# Patient Record
Sex: MALE | Race: WHITE | Employment: FULL TIME | URBAN - METROPOLITAN AREA
[De-identification: names, ages, dates, MRNs, and addresses within clinical notes are randomized per-mention and may not be internally consistent; named-entity substitution may affect disease eponyms.]

---

## 2022-06-04 ENCOUNTER — HOSPITAL ENCOUNTER (EMERGENCY)
Age: 52
Discharge: HOME | End: 2022-06-05
Attending: EMERGENCY MEDICINE
Payer: COMMERCIAL

## 2022-06-04 ENCOUNTER — APPOINTMENT (OUTPATIENT)
Dept: GENERAL RADIOLOGY | Age: 52
End: 2022-06-04
Payer: COMMERCIAL

## 2022-06-04 DIAGNOSIS — Z72.0 TOBACCO USE: ICD-10-CM

## 2022-06-04 DIAGNOSIS — R47.1 DYSARTHRIA: ICD-10-CM

## 2022-06-04 DIAGNOSIS — R03.0 ELEVATED BLOOD PRESSURE READING: ICD-10-CM

## 2022-06-04 DIAGNOSIS — R29.898 WEAKNESS OF LEFT ARM: ICD-10-CM

## 2022-06-04 DIAGNOSIS — I63.9 CEREBROVASCULAR ACCIDENT (CVA), UNSPECIFIED MECHANISM (HCC): Primary | ICD-10-CM

## 2022-06-04 LAB
GLUCOSE BLD-MCNC: 168 MG/DL (ref 70–99)
PERFORMED ON: ABNORMAL

## 2022-06-04 PROCEDURE — 99285 EMERGENCY DEPT VISIT HI MDM: CPT

## 2022-06-04 NOTE — LETTER
[facility] Emergency Department  [address] [address]  Phone: [phone]    Patient: Jennifer Guerrero  YOB: 1970  Date: 6/5/2022 Time: 2:41 AM    Leaving the [address] [place] [address] Advice    Chart #:[id number]    This will certify that I, the undersigned,    ______________________________________________________________________    A patient in the above named medical center, having requested discharge and removal from the medical center against the advice of my attending physician(s), hereby release the Emergency Department, its physicians, officers and employees, severally and individually, from any and all liability of any nature whatsoever for any injury or harm or complication of any kind that may result directly or indirectly, by reason of my terminating my stay as a patient from [facility], and hereby waive any and all rights of action I may now have or later acquire as a result of my voluntary departure from [facility] and the termination of my stay as a patient therein. This release is made with the full knowledge of the danger that may result from the action which I am taking.       Date:_______________________                         ___________________________                                                                                    Patient/Legal Representative    Witness:        ____________________________                          ___________________________  Nurse                                                                        Physician

## 2022-06-05 ENCOUNTER — APPOINTMENT (OUTPATIENT)
Dept: CT IMAGING | Age: 52
End: 2022-06-05
Payer: COMMERCIAL

## 2022-06-05 ENCOUNTER — APPOINTMENT (OUTPATIENT)
Dept: GENERAL RADIOLOGY | Age: 52
End: 2022-06-05
Payer: COMMERCIAL

## 2022-06-05 VITALS
BODY MASS INDEX: 29.68 KG/M2 | TEMPERATURE: 97.1 F | HEIGHT: 69 IN | SYSTOLIC BLOOD PRESSURE: 168 MMHG | RESPIRATION RATE: 21 BRPM | HEART RATE: 82 BPM | DIASTOLIC BLOOD PRESSURE: 108 MMHG | WEIGHT: 200.4 LBS | OXYGEN SATURATION: 97 %

## 2022-06-05 PROBLEM — F18.10 ABUSE OF SMOKED SUBSTANCE (HCC): Status: ACTIVE | Noted: 2022-06-05

## 2022-06-05 PROBLEM — R53.1 LEFT-SIDED WEAKNESS: Status: ACTIVE | Noted: 2022-06-05

## 2022-06-05 LAB
A/G RATIO: 1.6 (ref 1.1–2.2)
ALBUMIN SERPL-MCNC: 4.4 G/DL (ref 3.4–5)
ALP BLD-CCNC: 85 U/L (ref 40–129)
ALT SERPL-CCNC: <5 U/L (ref 10–40)
ANION GAP SERPL CALCULATED.3IONS-SCNC: 16 MMOL/L (ref 3–16)
AST SERPL-CCNC: <5 U/L (ref 15–37)
BASOPHILS ABSOLUTE: 0.2 K/UL (ref 0–0.2)
BASOPHILS RELATIVE PERCENT: 1.8 %
BILIRUB SERPL-MCNC: 0.3 MG/DL (ref 0–1)
BUN BLDV-MCNC: 15 MG/DL (ref 7–20)
CALCIUM SERPL-MCNC: 9.1 MG/DL (ref 8.3–10.6)
CHLORIDE BLD-SCNC: 100 MMOL/L (ref 99–110)
CO2: 19 MMOL/L (ref 21–32)
CREAT SERPL-MCNC: 1.1 MG/DL (ref 0.9–1.3)
EKG ATRIAL RATE: 88 BPM
EKG DIAGNOSIS: NORMAL
EKG P AXIS: 45 DEGREES
EKG P-R INTERVAL: 162 MS
EKG Q-T INTERVAL: 356 MS
EKG QRS DURATION: 86 MS
EKG QTC CALCULATION (BAZETT): 430 MS
EKG R AXIS: 63 DEGREES
EKG T AXIS: 51 DEGREES
EKG VENTRICULAR RATE: 88 BPM
EOSINOPHILS ABSOLUTE: 0.3 K/UL (ref 0–0.6)
EOSINOPHILS RELATIVE PERCENT: 3.7 %
GFR AFRICAN AMERICAN: >60
GFR NON-AFRICAN AMERICAN: >60
GLUCOSE BLD-MCNC: 161 MG/DL (ref 70–99)
HCT VFR BLD CALC: 44.8 % (ref 40.5–52.5)
HEMOGLOBIN: 16.4 G/DL (ref 13.5–17.5)
INR BLD: 1.01 (ref 0.87–1.14)
LYMPHOCYTES ABSOLUTE: 2.9 K/UL (ref 1–5.1)
LYMPHOCYTES RELATIVE PERCENT: 34 %
MCH RBC QN AUTO: 34.2 PG (ref 26–34)
MCHC RBC AUTO-ENTMCNC: 36.6 G/DL (ref 31–36)
MCV RBC AUTO: 93.3 FL (ref 80–100)
MONOCYTES ABSOLUTE: 0.8 K/UL (ref 0–1.3)
MONOCYTES RELATIVE PERCENT: 9.1 %
NEUTROPHILS ABSOLUTE: 4.3 K/UL (ref 1.7–7.7)
NEUTROPHILS RELATIVE PERCENT: 51.4 %
PDW BLD-RTO: 13.7 % (ref 12.4–15.4)
PLATELET # BLD: 176 K/UL (ref 135–450)
PMV BLD AUTO: 9 FL (ref 5–10.5)
POTASSIUM REFLEX MAGNESIUM: 5.4 MMOL/L (ref 3.5–5.1)
PROTHROMBIN TIME: 13.2 SEC (ref 11.7–14.5)
RBC # BLD: 4.8 M/UL (ref 4.2–5.9)
SODIUM BLD-SCNC: 135 MMOL/L (ref 136–145)
TOTAL PROTEIN: 7.2 G/DL (ref 6.4–8.2)
TROPONIN: <0.01 NG/ML
WBC # BLD: 8.4 K/UL (ref 4–11)

## 2022-06-05 PROCEDURE — 70450 CT HEAD/BRAIN W/O DYE: CPT

## 2022-06-05 PROCEDURE — 84484 ASSAY OF TROPONIN QUANT: CPT

## 2022-06-05 PROCEDURE — 85025 COMPLETE CBC W/AUTO DIFF WBC: CPT

## 2022-06-05 PROCEDURE — 6360000004 HC RX CONTRAST MEDICATION: Performed by: PHYSICIAN ASSISTANT

## 2022-06-05 PROCEDURE — 80053 COMPREHEN METABOLIC PANEL: CPT

## 2022-06-05 PROCEDURE — 71045 X-RAY EXAM CHEST 1 VIEW: CPT

## 2022-06-05 PROCEDURE — 93005 ELECTROCARDIOGRAM TRACING: CPT | Performed by: EMERGENCY MEDICINE

## 2022-06-05 PROCEDURE — 6370000000 HC RX 637 (ALT 250 FOR IP): Performed by: EMERGENCY MEDICINE

## 2022-06-05 PROCEDURE — 93010 ELECTROCARDIOGRAM REPORT: CPT | Performed by: INTERNAL MEDICINE

## 2022-06-05 PROCEDURE — 36415 COLL VENOUS BLD VENIPUNCTURE: CPT

## 2022-06-05 PROCEDURE — 70498 CT ANGIOGRAPHY NECK: CPT

## 2022-06-05 PROCEDURE — 85610 PROTHROMBIN TIME: CPT

## 2022-06-05 RX ORDER — CLOPIDOGREL BISULFATE 75 MG/1
300 TABLET ORAL ONCE
Status: COMPLETED | OUTPATIENT
Start: 2022-06-05 | End: 2022-06-05

## 2022-06-05 RX ORDER — ASPIRIN 81 MG/1
81 TABLET ORAL DAILY
Qty: 90 TABLET | Refills: 1 | Status: SHIPPED | OUTPATIENT
Start: 2022-06-05

## 2022-06-05 RX ORDER — ROSUVASTATIN CALCIUM 20 MG/1
20 TABLET, COATED ORAL DAILY
Qty: 30 TABLET | Refills: 0 | Status: SHIPPED | OUTPATIENT
Start: 2022-06-05 | End: 2022-06-07

## 2022-06-05 RX ORDER — ASPIRIN 81 MG/1
324 TABLET, CHEWABLE ORAL ONCE
Status: COMPLETED | OUTPATIENT
Start: 2022-06-05 | End: 2022-06-05

## 2022-06-05 RX ADMIN — IOPAMIDOL 75 ML: 755 INJECTION, SOLUTION INTRAVENOUS at 00:01

## 2022-06-05 RX ADMIN — CLOPIDOGREL BISULFATE 300 MG: 75 TABLET ORAL at 01:13

## 2022-06-05 RX ADMIN — ASPIRIN 324 MG: 81 TABLET, CHEWABLE ORAL at 01:14

## 2022-06-05 NOTE — CONSULTS
Telemedicine Consult Note   Stroke Team                Patient Name: Viktoria Nam (18 y.o. male)  MRN: [id number]  : 1970  Admission Date: 2022   Current Date: 22    STROKE TIMELINE  Last Known Well: 4:00 PM  ED arrival time: 11:39 PM    This is a 46 y.o.male with tobacco use, who presented with dysarthria and L hand  weakness. ED provider evaluated and found NIHSS to be 3 for mild dysarthria, L hand  weakness and LUE numbness. CT head - no acute process  CTA head/neck - no LVO    No tpa 2/2 low NIHSS and out of time window. No thrombectomy 2/2 no LVO. Additional Recommendations:   Concern for small vessel acute ischemic stroke. - Recommend plavix load with 300 mg, as well as full dose ASA  - Permissive HTN  - Routine MRI brain  - NPO until passing bedside dysphagia screen or formal swallow  - PT/OT/SLP  - Consult neurology       For questions, call the  Stroke Team at [phone].     Dr. Nelida Fontaine Stroke Team          Telemedicine Time: 18 minutes

## 2022-06-05 NOTE — ED PROVIDER NOTES
[address] [facility]        Pt Name: Monica Garcia  MRN: [id number]  [place] 1970  Date of evaluation: 6/4/2022  Provider: Yasmeen Mckee PA-C  PCP: JONNA Suh CNP  Note Started: 12:15 AM EDT       I have seen and evaluated this patient with my supervising physician No att. providers found. Triage CHIEF COMPLAINT       Chief Complaint   Patient presents with    Numbness     pt states he had numbness in left arm started about 8 hours ago. Woke up from nap and had slurred speech. HISTORY OF PRESENT ILLNESS   (Location/Symptom, Timing/Onset, Context/Setting, Quality, Duration, Modifying Factors, Severity)  Note limiting factors. Chief Complaint: left sided numbness, slurred speech    Monica Garcia is a 46 y.o. male with PMH of tobacco abuse who presents to the emergency department with complaint of left arm numbness that started about 8 hours ago. Patient states that he did lay down to take a nap, to help alleviate the numbness, however when he woke up this was still present. He also had some slurred speech at that time. Nothing makes the symptoms better or worse. Denies this in the past.  Denies other medical problems. Denies any lightheadedness, headache, fever, chills, feeling sick otherwise    Nursing Notes were all reviewed and agreed with or any disagreements were addressed in the HPI. REVIEW OF SYSTEMS    (2-9 systems for level 4, 10 or more for level 5)     Review of Systems   Constitutional: Negative for chills and fever. HENT: Negative for ear pain and sore throat. Eyes: Negative for pain and redness. Respiratory: Negative for cough and shortness of breath. Cardiovascular: Negative for chest pain and leg swelling. Gastrointestinal: Negative for abdominal pain, constipation, diarrhea, nausea and vomiting. Genitourinary: Negative for dysuria and hematuria.    Musculoskeletal: Negative for back pain and neck pain. Skin: Negative for rash and wound. Neurological: Negative for light-headedness and headaches. PAST MEDICAL HISTORY   History reviewed. No pertinent past medical history. SURGICAL HISTORY     Past Surgical History:   Procedure Laterality Date    INGUINAL HERNIA REPAIR Bilateral 2019    TONSILLECTOMY         CURRENTMEDICATIONS       Discharge Medication List as of 6/5/2022  2:35 AM          ALLERGIES     Patient has no known allergies. FAMILYHISTORY       Family History   Adopted: Yes        SOCIAL HISTORY       Social History     Socioeconomic History    Marital status:      Spouse name: None    Number of children: None    Years of education: None    Highest education level: None   Occupational History    None   Tobacco Use    Smoking status: Current Every Day Smoker     Packs/day: 1.00     Years: 30.00     Pack years: 30.00     Types: Cigarettes    Smokeless tobacco: Never Used   Substance and Sexual Activity    Alcohol use: Never    Drug use: Never    Sexual activity: None   Other Topics Concern    None   Social History Narrative    None     Social Determinants of Health     Financial Resource Strain: Low Risk     Difficulty of Paying Living Expenses: Not hard at all   Food Insecurity: No Food Insecurity    Worried About Running Out of Food in the Last Year: Never true    Dorita of Food in the Last Year: Never true   Transportation Needs:     Lack of Transportation (Medical): Not on file    Lack of Transportation (Non-Medical):  Not on file   Physical Activity:     Days of Exercise per Week: Not on file    Minutes of Exercise per Session: Not on file   Stress:     Feeling of Stress : Not on file   Social Connections:     Frequency of Communication with Friends and Family: Not on file    Frequency of Social Gatherings with Friends and Family: Not on file    Attends Mosque Services: Not on file   [employer] of Clubs or Organizations: Not on file    Attends Club or Organization Meetings: Not on file    Marital Status: Not on file   Intimate Partner Violence:     Fear of Current or Ex-Partner: Not on file    Emotionally Abused: Not on file    Physically Abused: Not on file    Sexually Abused: Not on file   Housing Stability:     Unable to Pay for Housing in the Last Year: Not on file    Number of [place] in the Last Year: Not on file    Unstable Housing in the Last Year: Not on file       SCREENINGS   NIH Stroke Scale  Interval: Reassessment  Level of Consciousness (1a): Alert  LOC Questions (1b): Answers both correctly  LOC Commands (1c): Performs both tasks correctly  Best Gaze (2): Normal  Visual (3): No visual loss  Facial Palsy (4): Normal symmetrical movement  Motor Arm, Left (5a): No drift  Motor Arm, Right (5b): No drift  Motor Leg, Left (6a): No drift  Motor Leg, Right (6b): No drift  Limb Ataxia (7): Absent  Sensory (8): Normal  Best Language (9): No aphasia  Dysarthria (10): Normal  Extinction and Inattention (11): No abnormality  Total: 0Glasgow Coma Scale  Eye Opening: Spontaneous  Best Verbal Response: Oriented  Best Motor Response: Obeys commands  Becky Coma Scale Score: 15        PHYSICAL EXAM    (up to 7 for level 4, 8 or more for level 5)     ED Triage Vitals [06/04/22 2345]   BP Temp Temp Source Heart Rate Resp SpO2 Height Weight   (!) 179/101 97.1 °F (36.2 °C) Oral 89 17 97 % 5' 9\" (1.753 m) 200 lb 6.4 oz (90.9 kg)       Physical Exam  Constitutional:       General: He is not in acute distress. Appearance: Normal appearance. He is not ill-appearing, toxic-appearing or diaphoretic. HENT:      Head: Normocephalic and atraumatic. Right Ear: External ear normal.      Left Ear: External ear normal.      Nose: Nose normal.   Eyes:      General: No visual field deficit. Right eye: No discharge. Left eye: No discharge.    Cardiovascular:      Rate and Rhythm: Normal rate and regular rhythm. Pulses: Normal pulses. Heart sounds: Normal heart sounds. No murmur heard. No gallop. Pulmonary:      Effort: Pulmonary effort is normal. No respiratory distress. Breath sounds: Normal breath sounds. No stridor. No wheezing, rhonchi or rales. Abdominal:      General: Abdomen is flat. Palpations: Abdomen is soft. Tenderness: There is no abdominal tenderness. Musculoskeletal:         General: Normal range of motion. Cervical back: Normal range of motion. Right lower leg: No edema. Left lower leg: No edema. Skin:     General: Skin is warm and dry. Neurological:      General: No focal deficit present. Mental Status: He is alert and oriented to person, place, and time. Cranial Nerves: Cranial nerves are intact. No cranial nerve deficit or facial asymmetry. Sensory: Sensory deficit present. Motor: Pronator drift (left side, does not hit bed) present. Coordination: Coordination is intact. Finger-Nose-Finger Test and Heel to [place] karlie Test normal.      Gait: Gait is intact.  Gait normal.      Comments: Decreased sensation of left upper extremity  Normal sensations of other extremities  No horizontal or vertical nystagmus  Negative test of skew     Psychiatric:         Mood and Affect: Mood normal.         Behavior: Behavior normal.         DIAGNOSTIC RESULTS   LABS:    Labs Reviewed   CBC WITH AUTO DIFFERENTIAL - Abnormal; Notable for the following components:       Result Value    MCH 34.2 (*)     MCHC 36.6 (*)     All other components within normal limits   COMPREHENSIVE METABOLIC PANEL W/ REFLEX TO MG FOR LOW K - Abnormal; Notable for the following components:    Sodium 135 (*)     Potassium reflex Magnesium 5.4 (*)     CO2 19 (*)     Glucose 161 (*)     ALT <5 (*)     AST <5 (*)     All other components within normal limits   POCT GLUCOSE - Abnormal; Notable for the following components:    POC Glucose 168 (*)     All other components within normal limits   TROPONIN   PROTIME-INR   POCT GLUCOSE       When ordered, only abnormal lab results are displayed. All other labs were within normal range or not returned as of this dictation. EKG: When ordered, EKG's are interpreted by the Emergency Department Physician in the absence of a cardiologist.  Please see their note for interpretation of EKG. RADIOLOGY:   Non-plain film images such as CT, Ultrasound and MRI are read by the radiologist. Plain radiographic images are visualized andpreliminarily interpreted by the  ED Provider with the below findings:        Interpretation [facility] Radiologist below, if available at the time of this note:    XR CHEST PORTABLE   Final Result   No acute cardiopulmonary findings. CTA HEAD NECK W CONTRAST   Final Result   Negative for large vessel occlusion or hemodynamic stenosis         CT Head WO Contrast   Final Result   No acute intracranial abnormality. The findings were sent to the Radiology Results [address] at 12:12   am on 6/5/2022 to be communicated to a licensed caregiver. No results found.       PROCEDURES   Unless otherwise noted below, none     Procedures    CRITICAL CARE TIME   N/A    CONSULTS:  IP CONSULT TO PHARMACY  PHARMACY TO CHANGE BASE FLUIDS  IP CONSULT TO HOSPITALIST      EMERGENCY DEPARTMENT COURSE and DIFFERENTIAL DIAGNOSIS/MDM:   Vitals:    Vitals:    06/04/22 2345 06/05/22 0230   BP: (!) 179/101 (!) 168/108   Pulse: 89 82   Resp: 17 21   Temp: 97.1 °F (36.2 °C)    TempSrc: Oral    SpO2: 97% 97%   Weight: 200 lb 6.4 oz (90.9 kg)    Height: 5' 9\" (1.753 m)        Patient was given thefollowing medications:  Medications   iopamidol (ISOVUE-370) 76 % injection 75 mL (75 mLs IntraVENous Given 6/5/22 0001)   aspirin chewable tablet 324 mg (324 mg Oral Given 6/5/22 0114)   clopidogrel (PLAVIX) tablet 300 mg (300 mg Oral Given 6/5/22 0113)         Is this patient to be included in the SEP-1 Core Measure due to severe sepsis or septic shock? No   Exclusion criteria - the patient is NOT to be included for SEP-1 Core Measure due to: Infection is not suspected    This is a 61-year-old male with PMH of tobacco abuse who presents emergency department with complaint of left arm numbness that started around 4 PM.  Patient then took a nap and woke up with some slurred speech. Initial NIH stroke scale was completed by myself and I did give patient a initial number of 3, due to patient's slurred speech, drift of his left arm but not hitting the bed, as well as decreased sensation in his left arm--different from his right arm. At this time a code stroke was called as patients last known well was within the last 24 hours. His blood glucose at the time was 159. CT head and CTA head were performed. I spoke with radiology partners, Dr. Shayy Rivers at 4542 with negative CT head results. CTA of the head was negative for large vessel occlusion or hemodynamic stenosis. Additional blood work was performed and shows mild hyponatremia at 135, elevated potassium of 5.4, with other blood tests that were within normal limits. At this time we did discuss with patient being admitted to the hospital for further evaluation with an MRI. Patient states that he does not want to stay, and would like to follow-up outpatient. We did discuss with patient that this could be detrimental to his daily function, and could possibly result in death. Patient still decided to leave [address] The patient did discuss this in detail with my attending, Dr. Anaya Soni, we also saw and evaluated this patient and spoke to other consulting physicians. FINAL IMPRESSION      1. Cerebrovascular accident (CVA), unspecified mechanism ([address]    2. Dysarthria    3. Weakness of left arm    4. Elevated blood pressure reading    5.  Tobacco use          DISPOSITION/PLAN   DISPOSITION [place] 06/05/2022 02:33:12 AM      PATIENT REFERREDTO:  JONNA Feng - [facility]  [facility]  [phone]    In 1 day      [address] [facility] [phone]  In 1 day        DISCHARGE MEDICATIONS:  Discharge Medication List as of 6/5/2022  2:35 AM      START taking these medications    Details   aspirin EC 81 MG EC tablet Take 1 tablet by mouth daily, Disp-90 tablet, R-1Print      rosuvastatin (CRESTOR) 20 MG tablet Take 1 tablet by mouth daily, Disp-30 tablet, R-0Print             DISCONTINUED MEDICATIONS:  Discharge Medication List as of 6/5/2022  2:35 AM                 (Please note that portions ofthis note were completed with a voice recognition program.  Efforts were made to edit the dictations but occasionally words are mis-transcribed.)    Devon Tejada PA-C (electronically signed)              Devon Tejada PA-C  07/06/22 4481

## 2022-06-05 NOTE — ED TRIAGE NOTES
Pt to ED with numbness in Left arm with slurred speech starting at 4pm.  Pt h/o smoker. Brenda Howard at bedside with RN. NIHSS completed per PA and Dr. Anaya Soni at bedside as well. Code Stroke called. RN with PIV and accompanied pt to CT. Blood Glucose per Tech 159. Charge RN at bedside.

## 2022-06-05 NOTE — ED PROVIDER NOTES
In addition to the advanced practice provider, I personally saw Pauline Savage and performed a substantive portion of the visit including all aspects of the medical decision making. Briefly, this is a 46 y.o. male here for left arm weakness and slurred speech which began at 1600 today. Associated with tingling sensations in his left arm as well. Nothing makes the symptoms any better or worse, states his speech has seemed to be improved slightly since onset of symptoms however. Denies any history of similar. Denies any known medical problems, takes no regular medications. Smokes tobacco.    On exam, patient afebrile and nontoxic. No distress. Heart RRR. Lungs CTAB. Abdomen soft, nondistended, nontender to palpation in all quadrants. A&Ox4. PERRL, face symmetric, speech mildly dysarthric. No aphasia. CN 2-12 intact. Left upper extremity with slight drift, diminished handgrip when compared to right. No drift of other extremities. .  Reduced sensation overlying posterior aspect of left forearm. Sensation otherwise grossly intact throughout all extremities. . Normal finger to nose, normal heel to shin. Normal gait observed. EKG  EKG was reviewed by emergency department physician in the absence of a cardiologist    Narrow complex sinus rhythm, rate 88, normal axis, normal LA and QRS intervals, normal Qtc, no ST elevations or depressions, normal t-wave morphology, impression NSR, no STEMI      Screenings  NIH Stroke Scale  Interval: Reassessment  Level of Consciousness (1a): Alert  LOC Questions (1b): Answers both correctly  LOC Commands (1c): Performs both tasks correctly  Best Gaze (2): Normal  Visual (3): No visual loss  Facial Palsy (4): Normal symmetrical movement  Motor Arm, Left (5a): Slight drift does not hit bed, diminished handgrip  Motor Arm, Right (5b): No drift  Motor Leg, Left (6a): No drift  Motor Leg, Right (6b):  No drift  Limb Ataxia (7): Absent  Sensory (8): In one extremity  Best Language (9): No aphasia  Dysarthria (10): Mild dysartria  Extinction and Inattention (11): No abnormality  Total: 3 Becky Coma Scale  Eye Opening: Spontaneous  Best Verbal Response: Oriented  Best Motor Response: Obeys commands  [place] Coma Scale Score: 15      Is this patient to be included in the SEP-1 Core Measure due to severe sepsis or septic shock? No   Exclusion criteria - the patient is NOT to be included for SEP-1 Core Measure due to: Infection is not suspected      MDM    Patient afebrile and nontoxic. No distress. No hypoglycemia. On my initial evaluation shortly after patient's arrival to the emergency department, noted weakness of left upper extremity as well as mild dysarthria. A stroke alert was activated. CT head shows no evidence of hemorrhage or mass-effect. CTA without evidence of large vessel occlusion or other acute abnormality. Cardiac work-up reassuring, no evidence of ACS or malignant dysrhythmia. Labs without acute endorgan dysfunction or clinically significant electrolyte derangement. No findings to suggest infectious etiology. I discussed case with Dr. Marcia Macias for  stroke team, current NIHSS of 3, patient is not a candidate for thrombolytics given mild deficits and arrived to the emergency department outside of safe time window. Aspirin given. Dr. Marcia Macias also recommends 300 mg Plavix which was also given. Patient's neurologic deficits remained stable over his emergency department course. Case discussed with internal medicine team who will admit for further evaluation and care. My plan for admission was discussed with patient and his spouse and they were initially agreeable, however after admission patient changed his mind and refused admission, states he is concerned about the bill and requests an MRI in the outpatient setting.   I discussed at length with patient and his spouse (with patient's permission) my concern for acute stroke as the cause of his speech changes and weakness and a significant risk of worsening symptoms or second stroke without further evaluation and admission to the hospital.  Patient verbalized his understanding however continued to refuse admission. I discussed my concerns of acute stroke with Jordan Thorpe and recommended admission to the hospital for further evaluation and management of a potentially life-threatening medical condition. The patient is adamant that he does not want to be hospitalized and wishes to be discharged. I discussed with the patient the risk of worsening stroke symptoms, permanent disability or death and the patient verbalized understanding of these risks. The patient is alert, fully oriented and without any evidence of intoxication and has the capacity to make this decision. I counseled the patient that they are welcome to return to the emergency department at any time should they change their mind or have any other concerns. Patient instructed to take daily 81 mg aspirin. We will also prescribe Crestor. Will order outpatient MRI for patient in order to expedite evaluation, however I discussed with patient at length that following up on the results of MRI with his PCP would be his responsibility. I discussed that while these interventions are certainly better than nothing, this is not my recommended treatment plan which still necessitates hospital admission. Critical Care:    I have discussed the case with the advanced practice provider. I have personally performed a history, physical exam, and my own medical decision making. I have reviewed the note and agree with the findings and plan. Upon my evaluation, this patient had a high probability of imminent or life-threatening deterioration due to dysarthria, acute limb weakness, suspected CVA/TIA which required my direct attention, intervention, and personal management. Specialist consultation with [facility] stroke team was obtained.     I personally saw the patient and independently provided 38 minutes of non-concurrent critical care out of the total shared critical care time provided. The critical care time spent while evaluating and treating this patient was exclusive of any time spent doing separately billable procedures. This critical care time includes time at the bedside, data interpretation, medication management, monitoring for potential decompensation and physician consultation. Specifics of interventions taken and potentially life-threatening diagnostic considerations are listed above in the medical decision making. Patient Referrals:  No follow-up provider specified. Discharge Medications:  New Prescriptions    No medications on file       FINAL IMPRESSION  1. Cerebrovascular accident (CVA), unspecified mechanism ([address]    2. Dysarthria    3. Weakness of left arm    4. Elevated blood pressure reading    5. Tobacco use        Blood pressure (!) 179/101, pulse 89, temperature 97.1 °F (36.2 °C), temperature source Oral, resp. rate 17, height 5' 9\" (1.753 m), weight 200 lb 6.4 oz (90.9 kg), SpO2 97 %. For further details of [facility] emergency department encounter, please see documentation by advanced practice provider, JERRICA Jacobson.     Kylah Calzada DO (electronically signed)  Attending Emergency Physician       Kylah Calzada DO  06/05/22 9165

## 2022-06-14 ENCOUNTER — HOSPITAL ENCOUNTER (OUTPATIENT)
Dept: MRI IMAGING | Age: 52
Discharge: HOME | End: 2022-06-14
Payer: COMMERCIAL

## 2022-06-14 ENCOUNTER — CASE MANAGEMENT (OUTPATIENT)
Dept: FAMILY MEDICINE CLINIC | Age: 52
End: 2022-06-14

## 2022-06-14 DIAGNOSIS — I63.9 CEREBROVASCULAR ACCIDENT (CVA), UNSPECIFIED MECHANISM (HCC): ICD-10-CM

## 2022-06-14 PROCEDURE — 70551 MRI BRAIN STEM W/O DYE: CPT

## 2022-06-15 NOTE — PROGRESS NOTES
I was called by radiology with a MRI result that show 2 acute/subacute brain infarcts . The patient was seen in the ER on 6/4/22 with stroke symptoms and he left AMA. He had a office follow up and an MRI was ordered . The MRI was completed today. I tried to contact the patient however he never answered his phone . I will notify Ade Esparza of the MRI findings .

## 2022-07-06 ASSESSMENT — ENCOUNTER SYMPTOMS
COUGH: 0
ABDOMINAL PAIN: 0
NAUSEA: 0
SORE THROAT: 0
DIARRHEA: 0
CONSTIPATION: 0
VOMITING: 0
EYE PAIN: 0
SHORTNESS OF BREATH: 0
BACK PAIN: 0
EYE REDNESS: 0